# Patient Record
Sex: MALE | URBAN - METROPOLITAN AREA
[De-identification: names, ages, dates, MRNs, and addresses within clinical notes are randomized per-mention and may not be internally consistent; named-entity substitution may affect disease eponyms.]

---

## 2019-07-08 ENCOUNTER — NURSE NAVIGATOR (OUTPATIENT)
Dept: OTHER | Age: 69
End: 2019-07-08

## 2019-07-08 NOTE — NURSE NAVIGATOR
Referring Provider: Alexei East MD      Lung Cancer Risk Profile:   Age: 70  Gender: Male  Height: 67.5\"  Weight: 125#    Smoking History:  Smoking Status: current use  # years smokin  # years quit: 0  Packs/day: 1  Pack years: 46    Patient discussed smoking cessation with PCP: Yes, per patient report    Patient participated in shared decision making process with PCP: Unknown    Patient is currently experiencing symptoms: No, per patient report    If yes what symptoms:     Co-Morbidities:  COPD    Cancer History:      Additional Risk Factors:    Father with lung cancer, exposure to second hand smoke      Patient's smoking history discussed via phone. Patient meets LDCT lung cancer screening criteria.  Call transferred to central scheduling to schedule exam.      LE DiazN, RN, [id number] [address] [facility] Nurse Navigator